# Patient Record
Sex: FEMALE | Race: WHITE | Employment: UNEMPLOYED | URBAN - METROPOLITAN AREA
[De-identification: names, ages, dates, MRNs, and addresses within clinical notes are randomized per-mention and may not be internally consistent; named-entity substitution may affect disease eponyms.]

---
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
## 2025-02-12 ENCOUNTER — TELEPHONE (OUTPATIENT)
Dept: FAMILY MEDICINE CLINIC | Facility: CLINIC | Age: 20
End: 2025-02-12

## 2025-02-12 DIAGNOSIS — J02.9 SORE THROAT: Primary | ICD-10-CM

## 2025-02-12 NOTE — TELEPHONE ENCOUNTER
Pt called wanting to get an ENT referral due to continuous sore throat but negative testing. She would like to go to any facility that accepts her insurance.

## 2025-02-13 ENCOUNTER — OFFICE VISIT (OUTPATIENT)
Dept: FAMILY MEDICINE CLINIC | Facility: CLINIC | Age: 20
End: 2025-02-13

## 2025-02-13 VITALS
OXYGEN SATURATION: 98 % | DIASTOLIC BLOOD PRESSURE: 82 MMHG | RESPIRATION RATE: 16 BRPM | SYSTOLIC BLOOD PRESSURE: 120 MMHG | HEART RATE: 100 BPM | TEMPERATURE: 98 F | WEIGHT: 198 LBS | HEIGHT: 64 IN | BODY MASS INDEX: 33.8 KG/M2

## 2025-02-13 DIAGNOSIS — J00 ACUTE NASOPHARYNGITIS: Primary | ICD-10-CM

## 2025-02-13 DIAGNOSIS — R11.0 NAUSEA: ICD-10-CM

## 2025-02-13 LAB
STREP PYOGENES DNA, POC: NEGATIVE
VALID INTERNAL CONTROL, POC: YES

## 2025-02-13 RX ORDER — ONDANSETRON 4 MG/1
4 TABLET, ORALLY DISINTEGRATING ORAL 4 TIMES DAILY PRN
Qty: 30 TABLET | Refills: 0 | Status: SHIPPED | OUTPATIENT
Start: 2025-02-13 | End: 2025-02-23

## 2025-02-13 RX ORDER — AZITHROMYCIN 250 MG/1
TABLET, FILM COATED ORAL
Qty: 6 TABLET | Refills: 0 | Status: SHIPPED | OUTPATIENT
Start: 2025-02-13 | End: 2025-02-23

## 2025-02-13 SDOH — ECONOMIC STABILITY: FOOD INSECURITY: WITHIN THE PAST 12 MONTHS, YOU WORRIED THAT YOUR FOOD WOULD RUN OUT BEFORE YOU GOT MONEY TO BUY MORE.: NEVER TRUE

## 2025-02-13 SDOH — ECONOMIC STABILITY: FOOD INSECURITY: WITHIN THE PAST 12 MONTHS, THE FOOD YOU BOUGHT JUST DIDN'T LAST AND YOU DIDN'T HAVE MONEY TO GET MORE.: NEVER TRUE

## 2025-02-13 ASSESSMENT — PATIENT HEALTH QUESTIONNAIRE - PHQ9
1. LITTLE INTEREST OR PLEASURE IN DOING THINGS: NOT AT ALL
SUM OF ALL RESPONSES TO PHQ9 QUESTIONS 1 & 2: 0
SUM OF ALL RESPONSES TO PHQ QUESTIONS 1-9: 0
2. FEELING DOWN, DEPRESSED OR HOPELESS: NOT AT ALL
SUM OF ALL RESPONSES TO PHQ QUESTIONS 1-9: 0

## 2025-02-13 ASSESSMENT — ENCOUNTER SYMPTOMS
VOMITING: 1
SORE THROAT: 1
RHINORRHEA: 1
NAUSEA: 1

## 2025-02-13 NOTE — PROGRESS NOTES
Raysa Nunez is a 19 y.o. female (: 2005) presenting to address:    Chief Complaint   Patient presents with    Vomiting    Fever     99.5 on Monday.     Pharyngitis       Vitals:    25 1058   BP: 120/82   Pulse: 100   Resp: 16   Temp: 98 °F (36.7 °C)   SpO2: 98%       \"Have you been to the ER, urgent care clinic since your last visit?  Hospitalized since your last visit?\"    Yes urgent care for covid last month.     “Have you seen or consulted any other health care providers outside of [facility] since your last visit?”    NO             
  Ht 1.626 m (5' 4\")   Wt 89.8 kg (198 lb)   LMP 02/09/2025   SpO2 98%   BMI 33.99 kg/m²     Physical Exam  Vitals and nursing note reviewed.   Constitutional:       General: She is not in acute distress.     Appearance: Normal appearance. She is not ill-appearing.   HENT:      Head: Normocephalic.      Right Ear: Tympanic membrane and ear canal normal.      Left Ear: Tympanic membrane and ear canal normal.      Mouth/Throat:      Pharynx: Oropharyngeal exudate and posterior oropharyngeal erythema present.   Cardiovascular:      Rate and Rhythm: Normal rate and regular rhythm.      Heart sounds: Normal heart sounds.   Pulmonary:      Effort: Pulmonary effort is normal.      Breath sounds: Normal breath sounds.   Musculoskeletal:      Cervical back: Neck supple.   Lymphadenopathy:      Cervical: No cervical adenopathy.   Skin:     General: Skin is warm and dry.   Neurological:      Mental Status: She is alert.   Psychiatric:         Mood and Affect: Mood normal.         Behavior: Behavior normal.        Results for orders placed or performed in visit on 02/13/25   AMB POC STREP GO A DIRECT, DNA PROBE   Result Value Ref Range    Valid Internal Control, POC yes     Strep pyogenes DNA, POC Negative Not Detected         ASSESSMENT and PLAN    1. Acute nasopharyngitis  -     AMB POC STREP GO A DIRECT, DNA PROBE  -     azithromycin (ZITHROMAX) 250 MG tablet; 500mg on day 1 followed by 250mg on days 2 - 5, Disp-6 tablet, R-0Normal  2. Nausea  -     ondansetron (ZOFRAN-ODT) 4 MG disintegrating tablet; Take 1 tablet by mouth 4 times daily as needed for Nausea or Vomiting, Disp-30 tablet, R-0Normal      Current Status:  Reports recurrent strep episodes and previous consideration for tonsillectomy not completed.  In view of this history and exam with tonsillar hypertrophy, erythema and exudate she will be provided with an antibiotic prescription.    Plan:  Azithromycin 250 mg, 2 tablets today then 1 tablet daily x 4 more

## 2025-02-13 NOTE — PATIENT INSTRUCTIONS
Current Status:  Reports recurrent strep episodes and previous consideration for tonsillectomy not completed.  In view of this history and exam with tonsillar hypertrophy, erythema and exudate she will be provided with an antibiotic prescription.    Plan:  Azithromycin 250 mg, 2 tablets today then 1 tablet daily x 4 more days  Ondansetron ODT 4 mg dissolved in the mouth 3-4 times daily as needed for nausea  Maintain hydration, if unable to do so and urine output drops recommend emergency department evaluation  Warm water gargles, acetaminophen/ibuprofen as needed for pain  Schedule follow-up with PCP within the next 2 weeks for follow-up of difficulty with multiple issues including migraine headaches

## 2025-03-03 ENCOUNTER — OFFICE VISIT (OUTPATIENT)
Dept: FAMILY MEDICINE CLINIC | Facility: CLINIC | Age: 20
End: 2025-03-03

## 2025-03-03 VITALS
DIASTOLIC BLOOD PRESSURE: 86 MMHG | HEIGHT: 64 IN | HEART RATE: 116 BPM | OXYGEN SATURATION: 98 % | BODY MASS INDEX: 33.29 KG/M2 | RESPIRATION RATE: 16 BRPM | SYSTOLIC BLOOD PRESSURE: 123 MMHG | TEMPERATURE: 97.8 F | WEIGHT: 195 LBS

## 2025-03-03 DIAGNOSIS — Z30.09 CONTRACEPTIVE EDUCATION: Primary | ICD-10-CM

## 2025-03-03 DIAGNOSIS — R21 RASH: ICD-10-CM

## 2025-03-03 DIAGNOSIS — J32.9 CHRONIC SINUSITIS, UNSPECIFIED LOCATION: ICD-10-CM

## 2025-03-03 RX ORDER — CETIRIZINE HYDROCHLORIDE 10 MG/1
10 TABLET ORAL DAILY
Qty: 90 TABLET | Refills: 1 | Status: SHIPPED | OUTPATIENT
Start: 2025-03-03

## 2025-03-03 RX ORDER — FLUTICASONE PROPIONATE 50 MCG
2 SPRAY, SUSPENSION (ML) NASAL DAILY
Qty: 16 G | Refills: 1 | Status: SHIPPED | OUTPATIENT
Start: 2025-03-03

## 2025-03-03 ASSESSMENT — ENCOUNTER SYMPTOMS
SHORTNESS OF BREATH: 0
CHEST TIGHTNESS: 0
SINUS PAIN: 0

## 2025-03-03 NOTE — PROGRESS NOTES
[facility]    HISTORY OF PRESENT ILLNESS  Raysa Nunez  is a 19 y.o. y.o. female here for FU.    Chronic sinusitis  -planned on getting adenoids removed over 10 years ago but cancelled because pt was ill  -never rescheduled but continues to get frequent URI  -endorses rhinorrhea often  -would like referral to ENT    Contraception  -interested in nexplanon    Hyperpigmentation  -at skin folds  -starts as red then gets darker over time  -has tried many different soaps with no improvement  -would like referral to derm    Mr#: [id number]      Past Medical History:   Diagnosis Date    Anxiety     Depression     Pre-diabetes        History reviewed. No pertinent surgical history.    Family History   Problem Relation Age of Onset    Thyroid Disease Mother     Hypertension Mother     Depression Mother     Migraines Mother     Seizures Mother     Anxiety Disorder Mother     Diabetes Maternal Grandmother     Parkinson's Disease Maternal Grandfather     Diabetes Maternal Grandfather     Dementia Maternal Grandfather     Bipolar Disorder Paternal Grandfather     Diabetes Paternal Grandmother     Cancer Paternal Grandmother        Allergies   Allergen Reactions    Paroxetine Hcl Palpitations     N/V within 30min of use    Amoxicillin Hives    Amoxicillin-Pot Clavulanate Hives       Social History     Tobacco Use   Smoking Status Never   Smokeless Tobacco Never       Social History     Substance and Sexual Activity   Alcohol Use Never       Immunization History   Administered Date(s) Administered    COVID-19, PFIZER PURPLE top, DILUTE for use, (age 12 y+), 30mcg/0.3mL 06/10/2021, 07/07/2021    DTaP, INFANRIX, (age 6w-6y), IM, 0.5mL 2005, 2005, 2005, 05/08/2007, 08/11/2009    HPV, GARDASIL 9, (age 9y-45y), IM, 0.5mL 08/09/2017, 01/30/2024    Hep A, HAVRIX, VAQTA, (age 12m-18y), IM, 0.5mL 05/30/2008, 12/01/2008    Hepatitis A Vaccine 05/30/2008, 12/01/2008    Hepatitis B

## 2025-03-03 NOTE — PROGRESS NOTES
Raysa Nunez is a 19 y.o. female (: 2005) presenting to address:    Chief Complaint   Patient presents with    Follow-up     Consistently getting sick    Referral for ENT- and Tonsil Removal        Vitals:    25 1451   BP: 123/86   Pulse: (!) 116   Resp: 16   Temp: 97.8 °F (36.6 °C)   SpO2: 98%       \"Have you been to the ER, urgent care clinic since your last visit?  Hospitalized since your last visit?\"    NO    “Have you seen or consulted any other health care providers outside of [facility] since your last visit?”    NO

## 2025-09-04 ENCOUNTER — HOSPITAL ENCOUNTER (OUTPATIENT)
Facility: HOSPITAL | Age: 20
Setting detail: SPECIMEN
Discharge: HOME | End: 2025-09-04
Payer: COMMERCIAL

## 2025-09-04 ENCOUNTER — OFFICE VISIT (OUTPATIENT)
Dept: FAMILY MEDICINE CLINIC | Facility: CLINIC | Age: 20
End: 2025-09-04

## 2025-09-04 VITALS
OXYGEN SATURATION: 98 % | RESPIRATION RATE: 15 BRPM | BODY MASS INDEX: 33.09 KG/M2 | HEART RATE: 88 BPM | TEMPERATURE: 98 F | WEIGHT: 193.8 LBS | HEIGHT: 64 IN | SYSTOLIC BLOOD PRESSURE: 102 MMHG | DIASTOLIC BLOOD PRESSURE: 72 MMHG

## 2025-09-04 DIAGNOSIS — Z00.00 ENCOUNTER FOR WELL ADULT EXAM WITHOUT ABNORMAL FINDINGS: ICD-10-CM

## 2025-09-04 DIAGNOSIS — Z72.89 OTHER PROBLEMS RELATED TO LIFESTYLE: ICD-10-CM

## 2025-09-04 DIAGNOSIS — Z11.4 SCREENING FOR HIV WITHOUT PRESENCE OF RISK FACTORS: ICD-10-CM

## 2025-09-04 DIAGNOSIS — E55.9 VITAMIN D DEFICIENCY: ICD-10-CM

## 2025-09-04 DIAGNOSIS — Z11.3 ROUTINE SCREENING FOR STI (SEXUALLY TRANSMITTED INFECTION): ICD-10-CM

## 2025-09-04 DIAGNOSIS — Z00.00 ENCOUNTER FOR WELL ADULT EXAM WITHOUT ABNORMAL FINDINGS: Primary | ICD-10-CM

## 2025-09-04 DIAGNOSIS — Z11.59 NEED FOR HEPATITIS C SCREENING TEST: ICD-10-CM

## 2025-09-04 DIAGNOSIS — Z13.220 SCREENING FOR HYPERLIPIDEMIA: ICD-10-CM

## 2025-09-04 DIAGNOSIS — Z13.30 ENCOUNTER FOR SCREENING EXAMINATION FOR MENTAL HEALTH AND BEHAVIORAL DISORDERS: ICD-10-CM

## 2025-09-04 LAB
25(OH)D3 SERPL-MCNC: 12.5 NG/ML (ref 30–100)
ANION GAP SERPL CALC-SCNC: 15 MMOL/L (ref 3–18)
BUN SERPL-MCNC: 8 MG/DL (ref 6–23)
BUN/CREAT SERPL: 11 (ref 12–20)
CALCIUM SERPL-MCNC: 9.7 MG/DL (ref 8.5–10.1)
CHLORIDE SERPL-SCNC: 102 MMOL/L (ref 98–107)
CHOLEST SERPL-MCNC: 205 MG/DL
CO2 SERPL-SCNC: 22 MMOL/L (ref 21–32)
CREAT SERPL-MCNC: 0.73 MG/DL (ref 0.6–1.3)
ERYTHROCYTE [DISTWIDTH] IN BLOOD BY AUTOMATED COUNT: 12.9 % (ref 11.6–14.5)
GLUCOSE SERPL-MCNC: 143 MG/DL (ref 74–108)
HCT VFR BLD AUTO: 41.7 % (ref 35–45)
HDLC SERPL-MCNC: 34 MG/DL (ref 40–60)
HDLC SERPL: 6.1 (ref 0–5)
HGB BLD-MCNC: 13 G/DL (ref 12–16)
LDLC SERPL CALC-MCNC: 156 MG/DL (ref 0–100)
MCH RBC QN AUTO: 26.9 PG (ref 24–34)
MCHC RBC AUTO-ENTMCNC: 31.2 G/DL (ref 31–37)
MCV RBC AUTO: 86.2 FL (ref 78–100)
NRBC # BLD: 0 K/UL (ref 0–0.01)
NRBC BLD-RTO: 0 PER 100 WBC
PLATELET # BLD AUTO: 185 K/UL (ref 135–420)
PMV BLD AUTO: 10.7 FL (ref 9.2–11.8)
POTASSIUM SERPL-SCNC: 4.1 MMOL/L (ref 3.5–5.5)
RBC # BLD AUTO: 4.84 M/UL (ref 4.2–5.3)
SODIUM SERPL-SCNC: 140 MMOL/L (ref 136–145)
TRIGL SERPL-MCNC: 77 MG/DL (ref 0–150)
VLDLC SERPL CALC-MCNC: 15 MG/DL
WBC # BLD AUTO: 7 K/UL (ref 4.6–13.2)

## 2025-09-04 PROCEDURE — 80061 LIPID PANEL: CPT

## 2025-09-04 PROCEDURE — 85027 COMPLETE CBC AUTOMATED: CPT

## 2025-09-04 PROCEDURE — 87389 HIV-1 AG W/HIV-1&-2 AB AG IA: CPT

## 2025-09-04 PROCEDURE — 36415 COLL VENOUS BLD VENIPUNCTURE: CPT

## 2025-09-04 PROCEDURE — 86592 SYPHILIS TEST NON-TREP QUAL: CPT

## 2025-09-04 PROCEDURE — 80048 BASIC METABOLIC PNL TOTAL CA: CPT

## 2025-09-04 PROCEDURE — 87491 CHLMYD TRACH DNA AMP PROBE: CPT

## 2025-09-04 PROCEDURE — 82306 VITAMIN D 25 HYDROXY: CPT

## 2025-09-04 PROCEDURE — 87591 N.GONORRHOEAE DNA AMP PROB: CPT

## 2025-09-04 PROCEDURE — 86803 HEPATITIS C AB TEST: CPT

## 2025-09-04 RX ORDER — ERGOCALCIFEROL 1.25 MG/1
50000 CAPSULE, LIQUID FILLED ORAL WEEKLY
Qty: 12 CAPSULE | Refills: 1 | Status: SHIPPED | OUTPATIENT
Start: 2025-09-04

## 2025-09-04 ASSESSMENT — ENCOUNTER SYMPTOMS
CHEST TIGHTNESS: 0
SHORTNESS OF BREATH: 0
SINUS PAIN: 0

## 2025-09-05 LAB
C TRACH RRNA SPEC QL NAA+PROBE: NEGATIVE
HCV AB SER QL: NONREACTIVE
HIV 1+2 AB+HIV1 P24 AG SERPL QL IA: NONREACTIVE
HIV 1/2 RESULT COMMENT: NORMAL
N GONORRHOEA RRNA SPEC QL NAA+PROBE: NEGATIVE
RPR SER QL: NONREACTIVE
SPECIMEN SOURCE: NORMAL